# Patient Record
Sex: FEMALE | Race: WHITE | Employment: OTHER | URBAN - METROPOLITAN AREA
[De-identification: names, ages, dates, MRNs, and addresses within clinical notes are randomized per-mention and may not be internally consistent; named-entity substitution may affect disease eponyms.]

---

## 2017-04-05 ENCOUNTER — OFFICE VISIT (OUTPATIENT)
Dept: OBGYN CLINIC | Facility: CLINIC | Age: 57
End: 2017-04-05

## 2017-04-05 VITALS
WEIGHT: 118 LBS | SYSTOLIC BLOOD PRESSURE: 126 MMHG | BODY MASS INDEX: 20.91 KG/M2 | DIASTOLIC BLOOD PRESSURE: 70 MMHG | HEIGHT: 63 IN

## 2017-04-05 DIAGNOSIS — Z12.31 VISIT FOR SCREENING MAMMOGRAM: ICD-10-CM

## 2017-04-05 DIAGNOSIS — Z01.419 ENCOUNTER FOR WELL WOMAN EXAM WITH ROUTINE GYNECOLOGICAL EXAM: Primary | ICD-10-CM

## 2017-04-05 DIAGNOSIS — N95.2 POST-MENOPAUSE ATROPHIC VAGINITIS: ICD-10-CM

## 2017-04-05 DIAGNOSIS — R01.1 HEART MURMUR: ICD-10-CM

## 2017-04-05 PROCEDURE — 99386 PREV VISIT NEW AGE 40-64: CPT | Performed by: ADVANCED PRACTICE MIDWIFE

## 2017-04-05 NOTE — PROGRESS NOTES
HPI:    Patient ID: Murphy Hill is a 64year old female. who presents for a complete physical exam. Symptoms: is menopausal. Patient complains of   Pain with intercourse since menopause.      Wt Readings from Last 6 Encounters:  04/05/17 : 118 lb (53.5 Negative. Psychiatric/Behavioral: Negative. Current Outpatient Prescriptions:  NUTRITIONAL SUPPLEMENTS OR Take by mouth.  Disp:  Rfl:      Allergies:No Known Allergies   PHYSICAL EXAM:   Physical Exam   Constitutional: She is oriented to pers oriented to person, place, and time. Skin: Skin is warm and dry. Multiple freckles and moles on back less than 4 mm   Psychiatric: She has a normal mood and affect. Her behavior is normal. Judgment normal.   Nursing note and vitals reviewed.

## 2017-04-07 ENCOUNTER — MED REC SCAN ONLY (OUTPATIENT)
Dept: OBGYN CLINIC | Facility: CLINIC | Age: 57
End: 2017-04-07

## 2017-04-13 ENCOUNTER — PATIENT MESSAGE (OUTPATIENT)
Dept: OBGYN CLINIC | Facility: CLINIC | Age: 57
End: 2017-04-13

## 2017-04-13 DIAGNOSIS — R10.2 PELVIC PAIN: Primary | ICD-10-CM

## 2017-04-14 NOTE — TELEPHONE ENCOUNTER
From: Kobi Olivera  To:  TREVOR Almonte  Sent: 4/13/2017 11:43 PM CDT  Subject: Test Results Question    I got an email that I had test results but in looking at the test results, I cannot determine what they mean - I'm used to seeing negative or no

## 2017-07-06 ENCOUNTER — TELEPHONE (OUTPATIENT)
Dept: OBGYN CLINIC | Facility: CLINIC | Age: 57
End: 2017-07-06

## 2017-07-06 NOTE — TELEPHONE ENCOUNTER
Sonya Yañez, APN  [facility]             I sent a message from home for someone to contact this patient regarding an order for an [address] see the order but can not tell if the patient ever communicated with the staff or if she knows

## 2017-07-07 NOTE — TELEPHONE ENCOUNTER
Per the pt she already spoke with Samantha Cabral about the order and is aware that is has been sent.

## 2017-07-21 ENCOUNTER — TELEPHONE (OUTPATIENT)
Dept: OBGYN CLINIC | Facility: CLINIC | Age: 57
End: 2017-07-21

## 2017-07-28 ENCOUNTER — TELEPHONE (OUTPATIENT)
Dept: OBGYN CLINIC | Facility: CLINIC | Age: 57
End: 2017-07-28

## 2018-01-28 ENCOUNTER — HOSPITAL ENCOUNTER (OUTPATIENT)
Age: 58
Discharge: HOME | End: 2018-01-28
Attending: FAMILY MEDICINE
Payer: COMMERCIAL

## 2018-01-28 VITALS
HEART RATE: 76 BPM | SYSTOLIC BLOOD PRESSURE: 143 MMHG | OXYGEN SATURATION: 99 % | BODY MASS INDEX: 21.26 KG/M2 | DIASTOLIC BLOOD PRESSURE: 86 MMHG | HEIGHT: 63 IN | RESPIRATION RATE: 16 BRPM | WEIGHT: 120 LBS | TEMPERATURE: 100 F

## 2018-01-28 DIAGNOSIS — J11.1 INFLUENZA: Primary | ICD-10-CM

## 2018-01-28 PROCEDURE — 99214 OFFICE O/P EST MOD 30 MIN: CPT

## 2018-01-28 PROCEDURE — 99213 OFFICE O/P EST LOW 20 MIN: CPT

## 2018-01-28 RX ORDER — OSELTAMIVIR PHOSPHATE 75 MG/1
75 CAPSULE ORAL 2 TIMES DAILY
Qty: 10 CAPSULE | Refills: 0 | Status: SHIPPED | OUTPATIENT
Start: 2018-01-28 | End: 2018-02-02

## 2018-01-28 RX ORDER — OSELTAMIVIR PHOSPHATE 75 MG/1
75 CAPSULE ORAL 2 TIMES DAILY
Qty: 10 CAPSULE | Refills: 0 | Status: SHIPPED | OUTPATIENT
Start: 2018-01-28 | End: 2018-01-28 | Stop reason: CLARIF

## 2018-01-28 NOTE — ED INITIAL ASSESSMENT (HPI)
C/o fever, headache, watery eyes and cough, lethargy for the past day; had temp of 101.5 F at 1500; denies sore throat, vomiting or diarrhea. Does have some nausea and lack of appetite.

## 2018-01-28 NOTE — ED PROVIDER NOTES
Patient Seen in: [address]    History   Patient presents with:  Cough/URI    Stated Complaint: sore throat, cough    HPI    Patient here with cough, congestion for 1 days. No travel, no known sick contacts.   Patient Michelle Andres [01/28/18 1716]  BP: 143/86  Pulse: 76  Resp: 16  Temp: 99.5 °F (37.5 °C)  Temp src: Oral  SpO2: 99 %  O2 Device: None (Room air)    Current:/86   Pulse 76   Temp 99.5 °F (37.5 °C) (Oral)   Resp 16   Ht 160 cm (5' 3\")   Wt 54.4 kg   SpO2 99%   BMI 2

## 2018-06-01 PROCEDURE — 87480 CANDIDA DNA DIR PROBE: CPT | Performed by: OBSTETRICS & GYNECOLOGY

## 2018-06-01 PROCEDURE — 87510 GARDNER VAG DNA DIR PROBE: CPT | Performed by: OBSTETRICS & GYNECOLOGY

## 2018-06-01 PROCEDURE — 87660 TRICHOMONAS VAGIN DIR PROBE: CPT | Performed by: OBSTETRICS & GYNECOLOGY

## 2018-06-07 ENCOUNTER — TELEPHONE (OUTPATIENT)
Dept: OBGYN CLINIC | Facility: CLINIC | Age: 58
End: 2018-06-07

## 2018-06-07 NOTE — TELEPHONE ENCOUNTER
Patient has requested records to be sent to Dr Amelie Solomon; pap,colpo IUD removal/insertion from any years of treatment   [address], [facility] [address], [place] [address]    Sent to scan June 7,2018

## 2020-02-03 PROBLEM — Z87.410 HISTORY OF CERVICAL DYSPLASIA: Status: ACTIVE | Noted: 2020-02-03

## (undated) NOTE — Clinical Note
4/17/2017              [address] [place] [id number]         Dear Kely Nicholas,    It was a pleasure to see you. Your PAP test was normal.  There is no need for further testing at this time.   I look forward to seein

## (undated) NOTE — MR AVS SNAPSHOT
After Visit Summary   4/5/2017    Elizabeth Robbins    MRN: [id number]           Visit Information        Provider Department Dept Phone    4/5/2017  8:00 AM TREVOR Vyas [facility] [phone]      Your Vitals Were     BP Ht Wt BMI Breastfee [address]  [address] [place] [address]    It is the patient's responsibility to check with and follow their insurance company's guidelines for prior authorization for this test.  You may be held responsible for payment in full if pro